# Patient Record
(demographics unavailable — no encounter records)

---

## 2025-03-28 NOTE — PROCEDURE
[FreeTextEntry1] : After informed consent was obtained, a lubricated lighted anoscope was inserted into the anal canal to evaluate the hemorrhoids. The canal was inspected circumferentially up to the level above the dentate line.  The R anteiror internal hemorrhoid was banded above the dentate line.  The scope was withdrawn. The patient tolerated the procedure well.

## 2025-03-28 NOTE — PLAN
[TextEntry] : -- Discussed with patient that she should begin a powder fiber supplement (Metamucil, Benefiber, psyllium, etc).  She was also advised to drink at least 8 glasses of water daily in addition. -- Miralax and stool softener may be added as neededlreg -- Post rubber band ligation instructions given -- The R posterior skin tag is somewhat concerning in appearance for possible condyloma.  She reports she is up to date on Pap smears and these have been negative.  Would recommend future biopsy of this site - she notes that she has had vasovagal events in the past while undergoing in office biopsies, recommend waiting until she has a ride to and from clinic to do this. -- Follow up in 2-3 weeks for repeat evaluation, banding vs biopsy. -- She is also due for colonoscopy - will address anorectal disease for now and plan for eventual scope within this year.

## 2025-03-28 NOTE — HISTORY OF PRESENT ILLNESS
[FreeTextEntry1] : 45yoF who presents with complaints of bleeding per rectum.  She has a history of hemorrhoids treated ~5 years ago by Dr. Escobar with sclerotherapy.  She has never had a colonoscopy; no family history of CRC/polyps/IBD.  No fever/chills/drainage other than blood.  Some mild discomfort to perianal area on occasion.  She is not on a regular bowel regimen at this time.  No significant changes to bowel habit.

## 2025-03-28 NOTE — PHYSICAL EXAM
[JVD] : no jugular venous distention  [Respiratory Effort] : normal respiratory effort [Normal Rate and Rhythm] : normal rate and rhythm [No Edema] : No edema [No Rash or Lesion] : No rash or lesion [Alert] : alert [Oriented to Person] : oriented to person [Oriented to Place] : oriented to place [Oriented to Time] : oriented to time [Calm] : calm [de-identified] : Soft, nondistended [de-identified] : External exam: Multiple circumferential small to moderately sized skin tags.  AT R posterior there is a 1cm diameter tag that has rougher appearance potentially concerning for condyloma.  No fissure, fistula, excoriation.  AWILDA without masses/tenderness.  Anoscopy with grade II bleeding R anterior internal hemorrhoid banded as below; grade I-II inflamed but nonbleeding RP and LL hemorrhoids. [de-identified] : NAD [de-identified] : Normocephalic [de-identified] : Moves all extremities

## 2025-04-11 NOTE — PROCEDURE
[FreeTextEntry1] : After informed consent was obtained, a lubricated lighted anoscope was inserted into the anal canal. The canal was inspected circumferentially up to the level above the dentate line.  The scope was withdrawn. The patient tolerated the procedure well.  After obtaining informed consent, the skin tag was prepped with Betadine.  4 cc of 1% lidocaine with epinephrine was injected into the skin.  The skin tag was excised at its base and passed off for specimen.   Hemostasis was achieved with Monsel's solution.  The skin was cleaned and dried and a dressing placed.  The patient tolerated the procedure well.

## 2025-04-11 NOTE — PHYSICAL EXAM
[JVD] : no jugular venous distention  [Respiratory Effort] : normal respiratory effort [Normal Rate and Rhythm] : normal rate and rhythm [No Edema] : No edema [No Rash or Lesion] : No rash or lesion [Alert] : alert [Oriented to Person] : oriented to person [Oriented to Place] : oriented to place [Oriented to Time] : oriented to time [Calm] : calm [de-identified] : Soft, nondistended [de-identified] : External exam: Multiple circumferential small to moderately sized skin tags.  AT R posterior there is a 1cm diameter tag that has rougher appearance potentially concerning for condyloma - excised as below.  No fissure, fistula, excoriation.  AWILDA without masses/tenderness.  Anoscopy with circumferential non-inflamed grade I hemorrhoids. [de-identified] : NAD [de-identified] : Normocephalic [de-identified] : Moves all extremities

## 2025-04-11 NOTE — HISTORY OF PRESENT ILLNESS
[FreeTextEntry1] : 4/11/2025: Presents for follow-up.  No further bleeding per rectum/sensation of tissue prolapse.  Presents for excision of skin tag.  3/28/2025: 45yoF who presents with complaints of bleeding per rectum.  She has a history of hemorrhoids treated ~5 years ago by Dr. Escobar with sclerotherapy.  She has never had a colonoscopy; no family history of CRC/polyps/IBD.  No fever/chills/drainage other than blood.  Some mild discomfort to perianal area on occasion.  She is not on a regular bowel regimen at this time.  No significant changes to bowel habit.

## 2025-04-11 NOTE — PLAN
[TextEntry] : -- Skin tag biopsied and sent for specimen.  Of any concerns for condyloma would plan for further evaluation with EUA. -- Patient's hemorrhoid symptoms have resolved - no further treatment at this time -- Follow up in 3-4 weeks - wound check externally, also plan for scheduling screening colonoscopy if symptoms resolved (pending pathology)

## 2025-05-16 NOTE — HISTORY OF PRESENT ILLNESS
[FreeTextEntry1] : 5/16/2025: Patient had EUA, excision of perianal tag concerning for R posterior condyloma on 4/11/2025 - pathology returned as condyloma acuminatum.  4/11/2025: Presents for follow-up.  No further bleeding per rectum/sensation of tissue prolapse.  Presents for excision of skin tag.  3/28/2025: 45yoF who presents with complaints of bleeding per rectum.  She has a history of hemorrhoids treated ~5 years ago by Dr. Escobar with sclerotherapy.  She has never had a colonoscopy; no family history of CRC/polyps/IBD.  No fever/chills/drainage other than blood.  Some mild discomfort to perianal area on occasion.  She is not on a regular bowel regimen at this time.  No significant changes to bowel habit.

## 2025-05-16 NOTE — PHYSICAL EXAM
[JVD] : no jugular venous distention  [Respiratory Effort] : normal respiratory effort [Normal Rate and Rhythm] : normal rate and rhythm [No Edema] : No edema [No Rash or Lesion] : No rash or lesion [Alert] : alert [Oriented to Person] : oriented to person [Oriented to Place] : oriented to place [Oriented to Time] : oriented to time [Calm] : calm [de-identified] : Soft, nondistended [de-identified] : NAD [de-identified] : Normocephalic [de-identified] : Moves all extremities

## 2025-05-16 NOTE — PLAN
[TextEntry] : -- Continue bowel regimen -- She is due for colonoscopy.  Will schedule.  The patient is at average risk for colorectal cancer with no significant family history.  During today's visit, we discussed all aspects of her screening colonoscopy.  We spoke about the rationale for colorectal cancer screening at her age.  We also discussed the preprocedural bowel prep and the procedure itself.  We discussed the risks and benefits of the procedure.  Risks include but are not limited to bleeding, bloating, pain, colonic perforation, and missed lesions, in addition to risks associated with the anesthesia.  I counseled the patient that the timing of the next colonoscopy would depend on the findings of this upcoming procedure.  All questions were answered to her satisfaction.  She was provided with a printout detailing the Miralax/Dulcolax prep.  I will have my  reach out to the patient to schedule colonoscopy.  She is cleared for procedure from my standpoint.   -- Will also order scheduling of exam under anesthesia with high resolution anoscopy given findings of condyloma.  This should take place after colonoscopy.  No specific prep needed for this procedure and she is cleared for procedure from my standpoint. -- I will next see the patient for her scheduled colonoscopy.  EUA/HRA will occur after colonoscopy is done. ambulatory